# Patient Record
Sex: MALE | Race: OTHER | Employment: UNEMPLOYED | URBAN - METROPOLITAN AREA
[De-identification: names, ages, dates, MRNs, and addresses within clinical notes are randomized per-mention and may not be internally consistent; named-entity substitution may affect disease eponyms.]

---

## 2020-12-22 ENCOUNTER — TRANSCRIBE ORDER (OUTPATIENT)
Dept: SCHEDULING | Age: 50
End: 2020-12-22

## 2020-12-22 DIAGNOSIS — J44.9 COPD WITH ASTHMA (HCC): Primary | ICD-10-CM

## 2021-04-27 ENCOUNTER — OFFICE VISIT (OUTPATIENT)
Dept: CARDIOLOGY CLINIC | Age: 51
End: 2021-04-27
Payer: MEDICAID

## 2021-04-27 VITALS
BODY MASS INDEX: 24.37 KG/M2 | HEART RATE: 80 BPM | SYSTOLIC BLOOD PRESSURE: 130 MMHG | HEIGHT: 70 IN | WEIGHT: 170.2 LBS | OXYGEN SATURATION: 98 % | DIASTOLIC BLOOD PRESSURE: 86 MMHG | RESPIRATION RATE: 13 BRPM

## 2021-04-27 DIAGNOSIS — R06.09 DOE (DYSPNEA ON EXERTION): Primary | ICD-10-CM

## 2021-04-27 DIAGNOSIS — R94.31 ABNORMAL EKG: ICD-10-CM

## 2021-04-27 DIAGNOSIS — J43.1 PANLOBULAR EMPHYSEMA (HCC): ICD-10-CM

## 2021-04-27 DIAGNOSIS — R06.02 SHORTNESS OF BREATH: ICD-10-CM

## 2021-04-27 DIAGNOSIS — I49.3 VENTRICULAR ECTOPY: ICD-10-CM

## 2021-04-27 DIAGNOSIS — Z82.49 FAMILY HISTORY OF CARDIOMYOPATHY: ICD-10-CM

## 2021-04-27 DIAGNOSIS — F43.10 PTSD (POST-TRAUMATIC STRESS DISORDER): ICD-10-CM

## 2021-04-27 PROCEDURE — 99204 OFFICE O/P NEW MOD 45 MIN: CPT | Performed by: SPECIALIST

## 2021-04-27 PROCEDURE — 93000 ELECTROCARDIOGRAM COMPLETE: CPT | Performed by: SPECIALIST

## 2021-04-27 RX ORDER — ALBUTEROL SULFATE 90 UG/1
1 AEROSOL, METERED RESPIRATORY (INHALATION) AS NEEDED
COMMUNITY
Start: 2021-04-02

## 2021-04-27 RX ORDER — QUETIAPINE FUMARATE 400 MG/1
1 TABLET, FILM COATED ORAL
COMMUNITY
Start: 2021-04-15

## 2021-04-27 RX ORDER — METOPROLOL SUCCINATE 25 MG/1
1 TABLET, EXTENDED RELEASE ORAL DAILY
COMMUNITY
Start: 2021-03-23 | End: 2021-04-27 | Stop reason: ALTCHOICE

## 2021-04-27 RX ORDER — MONTELUKAST SODIUM 10 MG/1
1 TABLET ORAL DAILY
COMMUNITY
Start: 2021-04-21

## 2021-04-27 RX ORDER — TRAZODONE HYDROCHLORIDE 50 MG/1
1 TABLET ORAL
COMMUNITY
Start: 2021-04-16

## 2021-04-27 RX ORDER — CEPHALEXIN 500 MG/1
1 CAPSULE ORAL
COMMUNITY
Start: 2021-03-23 | End: 2021-04-27 | Stop reason: ALTCHOICE

## 2021-04-27 RX ORDER — BUDESONIDE AND FORMOTEROL FUMARATE DIHYDRATE 160; 4.5 UG/1; UG/1
1 AEROSOL RESPIRATORY (INHALATION) 2 TIMES DAILY
COMMUNITY
Start: 2021-04-25

## 2021-04-27 RX ORDER — LEVOCETIRIZINE DIHYDROCHLORIDE 5 MG/1
1 TABLET, FILM COATED ORAL DAILY
COMMUNITY
Start: 2021-04-21

## 2021-04-27 RX ORDER — QUETIAPINE FUMARATE 200 MG/1
1 TABLET, FILM COATED ORAL
COMMUNITY
Start: 2021-03-23

## 2021-04-27 RX ORDER — DIVALPROEX SODIUM 250 MG/1
1 TABLET, DELAYED RELEASE ORAL 2 TIMES DAILY
COMMUNITY
Start: 2021-04-16

## 2021-04-27 RX ORDER — HYDROXYZINE 50 MG/1
1 TABLET, FILM COATED ORAL
COMMUNITY
Start: 2021-03-23

## 2021-04-27 RX ORDER — PREDNISONE 5 MG/1
3 TABLET ORAL DAILY
COMMUNITY
Start: 2021-04-21
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
## 2021-05-27 ENCOUNTER — OFFICE VISIT (OUTPATIENT)
Dept: CARDIOLOGY CLINIC | Age: 51
End: 2021-05-27

## 2021-05-27 ENCOUNTER — ANCILLARY PROCEDURE (OUTPATIENT)
Dept: CARDIOLOGY CLINIC | Age: 51
End: 2021-05-27
Payer: MEDICAID

## 2021-05-27 VITALS
HEART RATE: 93 BPM | RESPIRATION RATE: 18 BRPM | DIASTOLIC BLOOD PRESSURE: 80 MMHG | HEIGHT: 70 IN | WEIGHT: 185 LBS | SYSTOLIC BLOOD PRESSURE: 122 MMHG | OXYGEN SATURATION: 96 % | BODY MASS INDEX: 26.48 KG/M2

## 2021-05-27 DIAGNOSIS — R06.02 SHORTNESS OF BREATH: ICD-10-CM

## 2021-05-27 DIAGNOSIS — Z82.49 FAMILY HISTORY OF CARDIOMYOPATHY: ICD-10-CM

## 2021-05-27 DIAGNOSIS — I49.3 VENTRICULAR ECTOPY: ICD-10-CM

## 2021-05-27 DIAGNOSIS — R94.31 ABNORMAL EKG: ICD-10-CM

## 2021-05-27 DIAGNOSIS — J43.1 PANLOBULAR EMPHYSEMA (HCC): ICD-10-CM

## 2021-05-27 DIAGNOSIS — F43.10 PTSD (POST-TRAUMATIC STRESS DISORDER): ICD-10-CM

## 2021-05-27 DIAGNOSIS — R06.02 SHORTNESS OF BREATH: Primary | ICD-10-CM

## 2021-05-27 DIAGNOSIS — R06.09 DOE (DYSPNEA ON EXERTION): ICD-10-CM

## 2021-05-27 LAB
STRESS ANGINA INDEX: 0
STRESS BASELINE DIAS BP: 84 MMHG
STRESS BASELINE HR: 74 BPM
STRESS BASELINE SYS BP: 132 MMHG
STRESS ESTIMATED WORKLOAD: 10.1 METS
STRESS EXERCISE DUR MIN: NORMAL MIN:SEC
STRESS O2 SAT PEAK: 96 %
STRESS PEAK DIAS BP: 78 MMHG
STRESS PEAK SYS BP: 162 MMHG
STRESS PERCENT HR ACHIEVED: 95 %
STRESS POST PEAK HR: 160 BPM
STRESS RATE PRESSURE PRODUCT: NORMAL BPM*MMHG
STRESS SR DUKE TREADMILL SCORE: 7
STRESS ST DEPRESSION: 0 MM
STRESS ST ELEVATION: 0 MM
STRESS TARGET HR: 168 BPM

## 2021-05-27 PROCEDURE — 93351 STRESS TTE COMPLETE: CPT | Performed by: SPECIALIST

## 2021-05-27 PROCEDURE — 99214 OFFICE O/P EST MOD 30 MIN: CPT | Performed by: SPECIALIST

## 2021-05-27 RX ORDER — NICOTINE 11MG/24HR
PATCH, TRANSDERMAL 24 HOURS TRANSDERMAL
COMMUNITY
Start: 2021-05-05

## 2021-05-27 RX ORDER — SERTRALINE HYDROCHLORIDE 50 MG/1
TABLET, FILM COATED ORAL
COMMUNITY
Start: 2021-05-15

## 2021-05-27 RX ORDER — LEVOFLOXACIN 250 MG/1
TABLET ORAL
COMMUNITY
Start: 2021-05-05

## 2021-05-27 NOTE — PROGRESS NOTES
HISTORY OF PRESENT ILLNESS  Harish Portillo is a 44 y.o. male. He was seen originally for shortness of breath in the setting of severe emphysema. He has posttraumatic stress disorder and is in a rehab facility here dealing with explosive anger issues. He had a stress echo in the office today and it was felt to be normal.  He was quite upset at having to wear a mask during the stress test.  HPI  Patient Active Problem List   Diagnosis Code    NAVAS (dyspnea on exertion) R06.00    Panlobular emphysema (HCC) J43.1    Abnormal finding on EKG R94.31    Ventricular ectopy I49.3    PTSD (post-traumatic stress disorder) F43.10     Current Outpatient Medications   Medication Sig Dispense Refill    Vitamin D3 50 mcg (2,000 unit) cap capsule       levoFLOXacin (LEVAQUIN) 250 mg tablet       sertraline (ZOLOFT) 50 mg tablet       hydrOXYzine HCL (ATARAX) 50 mg tablet Take 1 Tab by mouth every six (6) hours as needed.  montelukast (SINGULAIR) 10 mg tablet Take 1 Tab by mouth daily.  traZODone (DESYREL) 50 mg tablet Take 1 Tab by mouth nightly.  ProAir HFA 90 mcg/actuation inhaler Take 1 Puff by inhalation as needed.  Symbicort 160-4.5 mcg/actuation HFAA Take 1 Puff by inhalation two (2) times a day.  levocetirizine (XYZAL) 5 mg tablet Take 1 Tab by mouth daily.  divalproex DR (DEPAKOTE) 250 mg tablet Take 1 Tab by mouth two (2) times a day.  predniSONE (DELTASONE) 5 mg tablet Take 3 Tabs by mouth daily.  QUEtiapine (SEROquel) 400 mg tablet Take 1 Tab by mouth nightly.  QUEtiapine (SEROquel) 200 mg tablet Take 1 Tab by mouth nightly. (Patient not taking: Reported on 5/27/2021)       No past medical history on file. No past surgical history on file. Review of Systems   Respiratory: Positive for shortness of breath. All other systems reviewed and are negative.     Visit Vitals  /80 (BP 1 Location: Right arm, BP Patient Position: Sitting, BP Cuff Size: Adult) Pulse 93   Resp 18   Ht 5' 9.5\" (1.765 m)   Wt 185 lb (83.9 kg) Comment: patient reported   SpO2 96%   BMI 26.93 kg/m²       Physical Exam  Vitals and nursing note reviewed. Constitutional:       Appearance: Normal appearance. HENT:      Head: Normocephalic. Right Ear: External ear normal.      Left Ear: External ear normal.      Nose: Nose normal.      Mouth/Throat:      Mouth: Mucous membranes are moist.   Eyes:      Extraocular Movements: Extraocular movements intact. Cardiovascular:      Rate and Rhythm: Normal rate and regular rhythm. Pulses: Normal pulses. Heart sounds: Normal heart sounds. Pulmonary:      Effort: Pulmonary effort is normal.      Breath sounds: No wheezing or rales. Abdominal:      Palpations: Abdomen is soft. Musculoskeletal:         General: Normal range of motion. Cervical back: Normal range of motion. Skin:     General: Skin is warm. Neurological:      General: No focal deficit present. Mental Status: He is alert. ASSESSMENT and PLAN  His stress test is negative for coronary disease. His heart function is normal.  He does have diminished breath sounds and I suspect his shortness of breath is all due to his emphysema. He will follow with his pulmonary physician and I will see him back on an as-needed basis.

## 2022-03-18 PROBLEM — R06.09 DOE (DYSPNEA ON EXERTION): Status: ACTIVE | Noted: 2021-04-27

## 2022-03-19 PROBLEM — F43.10 PTSD (POST-TRAUMATIC STRESS DISORDER): Status: ACTIVE | Noted: 2021-04-27

## 2022-03-19 PROBLEM — I49.3 VENTRICULAR ECTOPY: Status: ACTIVE | Noted: 2021-04-27

## 2022-03-19 PROBLEM — R94.31 ABNORMAL FINDING ON EKG: Status: ACTIVE | Noted: 2021-04-27

## 2022-03-20 PROBLEM — J43.1 PANLOBULAR EMPHYSEMA (HCC): Status: ACTIVE | Noted: 2021-04-27

## 2023-05-15 RX ORDER — TRAZODONE HYDROCHLORIDE 50 MG/1
1 TABLET ORAL
COMMUNITY
Start: 2021-04-16

## 2023-05-15 RX ORDER — BUDESONIDE AND FORMOTEROL FUMARATE DIHYDRATE 160; 4.5 UG/1; UG/1
1 AEROSOL RESPIRATORY (INHALATION) 2 TIMES DAILY
COMMUNITY
Start: 2021-04-25

## 2023-05-15 RX ORDER — QUETIAPINE FUMARATE 200 MG/1
1 TABLET, FILM COATED ORAL
COMMUNITY
Start: 2021-03-23

## 2023-05-15 RX ORDER — MONTELUKAST SODIUM 10 MG/1
1 TABLET ORAL DAILY
COMMUNITY
Start: 2021-04-21

## 2023-05-15 RX ORDER — PREDNISONE 5 MG/1
3 TABLET ORAL DAILY
COMMUNITY
Start: 2021-04-21

## 2023-05-15 RX ORDER — QUETIAPINE FUMARATE 400 MG/1
1 TABLET, FILM COATED ORAL
COMMUNITY
Start: 2021-04-15

## 2023-05-15 RX ORDER — ALBUTEROL SULFATE 90 UG/1
1 AEROSOL, METERED RESPIRATORY (INHALATION) PRN
COMMUNITY
Start: 2021-04-02

## 2023-05-15 RX ORDER — DIVALPROEX SODIUM 250 MG/1
1 TABLET, DELAYED RELEASE ORAL 2 TIMES DAILY
COMMUNITY
Start: 2021-04-16

## 2023-05-15 RX ORDER — LEVOFLOXACIN 250 MG/1
TABLET ORAL
COMMUNITY
Start: 2021-05-05

## 2023-05-15 RX ORDER — LEVOCETIRIZINE DIHYDROCHLORIDE 5 MG/1
1 TABLET, FILM COATED ORAL DAILY
COMMUNITY
Start: 2021-04-21

## 2023-05-15 RX ORDER — HYDROXYZINE 50 MG/1
1 TABLET, FILM COATED ORAL EVERY 6 HOURS PRN
COMMUNITY
Start: 2021-03-23